# Patient Record
Sex: FEMALE | Race: WHITE | NOT HISPANIC OR LATINO | URBAN - METROPOLITAN AREA
[De-identification: names, ages, dates, MRNs, and addresses within clinical notes are randomized per-mention and may not be internally consistent; named-entity substitution may affect disease eponyms.]

---

## 2017-01-19 ENCOUNTER — APPOINTMENT (RX ONLY)
Dept: URBAN - METROPOLITAN AREA CLINIC 170 | Facility: CLINIC | Age: 67
Setting detail: DERMATOLOGY
End: 2017-01-19

## 2017-01-19 DIAGNOSIS — Z41.9 ENCOUNTER FOR PROCEDURE FOR PURPOSES OTHER THAN REMEDYING HEALTH STATE, UNSPECIFIED: ICD-10-CM

## 2017-01-19 PROCEDURE — ? FILLERS

## 2017-01-19 PROCEDURE — ? BOTOX

## 2017-01-19 ASSESSMENT — LOCATION ZONE DERM: LOCATION ZONE: FACE

## 2017-01-19 ASSESSMENT — LOCATION DETAILED DESCRIPTION DERM
LOCATION DETAILED: RIGHT INFERIOR MEDIAL MALAR CHEEK
LOCATION DETAILED: RIGHT MEDIAL MALAR CHEEK

## 2017-01-19 ASSESSMENT — LOCATION SIMPLE DESCRIPTION DERM: LOCATION SIMPLE: RIGHT CHEEK

## 2017-01-19 NOTE — PROCEDURE: FILLERS
Lateral Face Filler  Volume In Cc: 0
Map Statment: See Attach Map for Complete Details
Topical Anesthesia?: 2.5% lidocaine, 2.5% prilocaine
Detail Level: Zone
Additional Area 1 Location: Face
Lot #: [id number]
Lot #: [id number]
Expiration Date (Month Year): 7/7/17
Use Map Statement For Sites (Optional): Yes
Anesthesia Type: 1% lidocaine with epinephrine and a 1:10 solution of 8.4% sodium bicarbonate
Expiration Date (Month Year): 7/17, 6/17
Filler: Belotero
Anesthesia Volume In Cc: 0.2
Post-Care Instructions: Patient instructed to apply ice to reduce swelling.
Expiration Date (Month Year): 5/15/16
Lot #: [id number]
Consent: Written consent obtained. Risks include but not limited to bruising, beading, irregular texture, ulceration, infection, allergic reaction, scar formation, incomplete augmentation, temporary nature, procedural pain.
Additional Area 1 Volume In Cc: 2

## 2017-01-19 NOTE — PROCEDURE: BOTOX
Lateral Platysmal Bands Units: 0
Detail Level: Zone
Consent: Written consent obtained. Risks include but not limited to lid/brow ptosis, bruising, swelling, diplopia, temporary effect, incomplete chemical denervation.
Post-Care Instructions: Patient instructed to not lie down for 4 hours and limit physical activity for 24 hours.
Additional Area 1 Units: 62
Additional Area 1 Location: Face
Dilution (U/0.1 Cc): 0.4
Expiration Date (Month Year): 8/19
Lot #: [id number]
Price (Use Numbers Only, No Special Characters Or $): 848

## 2017-02-09 ENCOUNTER — APPOINTMENT (RX ONLY)
Dept: URBAN - METROPOLITAN AREA CLINIC 170 | Facility: CLINIC | Age: 67
Setting detail: DERMATOLOGY
End: 2017-02-09

## 2017-02-09 DIAGNOSIS — Z41.9 ENCOUNTER FOR PROCEDURE FOR PURPOSES OTHER THAN REMEDYING HEALTH STATE, UNSPECIFIED: ICD-10-CM

## 2017-02-09 PROCEDURE — ? PATIENT SPECIFIC COUNSELING

## 2017-06-01 ENCOUNTER — APPOINTMENT (RX ONLY)
Dept: URBAN - METROPOLITAN AREA CLINIC 170 | Facility: CLINIC | Age: 67
Setting detail: DERMATOLOGY
End: 2017-06-01

## 2017-06-01 DIAGNOSIS — Z41.9 ENCOUNTER FOR PROCEDURE FOR PURPOSES OTHER THAN REMEDYING HEALTH STATE, UNSPECIFIED: ICD-10-CM

## 2017-06-01 PROCEDURE — ? FILLERS

## 2017-06-01 PROCEDURE — ? BOTOX

## 2017-06-01 ASSESSMENT — LOCATION DETAILED DESCRIPTION DERM
LOCATION DETAILED: LEFT INFERIOR MEDIAL FOREHEAD
LOCATION DETAILED: RIGHT MEDIAL FOREHEAD

## 2017-06-01 ASSESSMENT — LOCATION SIMPLE DESCRIPTION DERM
LOCATION SIMPLE: LEFT FOREHEAD
LOCATION SIMPLE: RIGHT FOREHEAD

## 2017-06-01 ASSESSMENT — LOCATION ZONE DERM: LOCATION ZONE: FACE

## 2017-06-01 NOTE — PROCEDURE: BOTOX
Additional Area 1 Units: 62
Depressor Anguli Oris Units: 0
Dilution (U/0.1 Cc): 0.2
Price (Use Numbers Only, No Special Characters Or $): 229
Post-Care Instructions: Patient instructed to not lie down for 4 hours and limit physical activity for 24 hours.
Consent: Written consent obtained. Risks include but not limited to lid/brow ptosis, bruising, swelling, diplopia, temporary effect, incomplete chemical denervation.
Lot #:  [id number]
Expiration Date (Month Year): 12/19
Additional Area 1 Location: Face
Detail Level: Zone

## 2017-06-01 NOTE — PROCEDURE: FILLERS
Additional Area 5 Volume In Cc: 0
Filler: Ricardo Zafar
Additional Area 1 Volume In Cc: 3
Detail Level: Zone
Additional Area 1 Location: Face
Expiration Date (Month Year): 8/31/19, 6/30/19**
Lot #: [id number]
Lot #: [id number]
Map Statment: See Attach Map for Complete Details
Price (Use Numbers Only, No Special Characters Or $): 7794
Post-Care Instructions: Patient instructed to apply ice to reduce swelling.
Filler: Juvederm Ultra XC
Topical Anesthesia?: 2.5% lidocaine, 2.5% prilocaine
Expiration Date (Month Year): 4/30/19
Anesthesia Type: 1% lidocaine with epinephrine and a 1:10 solution of 8.4% sodium bicarbonate
Use Map Statement For Sites (Optional): Yes
Additional Area 1 Volume In Cc: 1
Expiration Date (Month Year): 7/3/18
Lot #: [id number], [id number]
Consent: Written consent obtained. Risks include but not limited to bruising, beading, irregular texture, ulceration, infection, allergic reaction, scar formation, incomplete augmentation, temporary nature, procedural pain.
Anesthesia Volume In Cc: 0.2

## 2017-07-05 ENCOUNTER — APPOINTMENT (RX ONLY)
Dept: URBAN - METROPOLITAN AREA CLINIC 170 | Facility: CLINIC | Age: 67
Setting detail: DERMATOLOGY
End: 2017-07-05

## 2017-07-05 DIAGNOSIS — Z41.9 ENCOUNTER FOR PROCEDURE FOR PURPOSES OTHER THAN REMEDYING HEALTH STATE, UNSPECIFIED: ICD-10-CM

## 2017-07-05 PROCEDURE — ? PATIENT SPECIFIC COUNSELING

## 2017-07-05 ASSESSMENT — LOCATION SIMPLE DESCRIPTION DERM: LOCATION SIMPLE: RIGHT CHEEK

## 2017-07-05 ASSESSMENT — LOCATION DETAILED DESCRIPTION DERM: LOCATION DETAILED: RIGHT MEDIAL MALAR CHEEK

## 2017-07-05 ASSESSMENT — LOCATION ZONE DERM: LOCATION ZONE: FACE

## 2017-08-02 ENCOUNTER — APPOINTMENT (RX ONLY)
Dept: URBAN - METROPOLITAN AREA CLINIC 170 | Facility: CLINIC | Age: 67
Setting detail: DERMATOLOGY
End: 2017-08-02

## 2017-08-02 DIAGNOSIS — L65.9 NONSCARRING HAIR LOSS, UNSPECIFIED: ICD-10-CM

## 2017-08-02 PROCEDURE — ? PLATELET RICH PLASMA INJECTION

## 2017-08-02 ASSESSMENT — LOCATION SIMPLE DESCRIPTION DERM: LOCATION SIMPLE: ANTERIOR SCALP

## 2017-08-02 ASSESSMENT — LOCATION DETAILED DESCRIPTION DERM: LOCATION DETAILED: MID-FRONTAL SCALP

## 2017-08-02 ASSESSMENT — LOCATION ZONE DERM: LOCATION ZONE: SCALP

## 2017-08-02 NOTE — PROCEDURE: PLATELET RICH PLASMA INJECTION
Detail Level: Zone
Depth In Mm (Will Not Render If 0): 0
Location #1: scalp
Post-Care Instructions: After the procedure, take precautions agains sun exposure. Do not apply sunscreen for 12 hours after the procedure. Do not apply make-up for 12 hours after the procedure. Avoid alcohol based toners for 10-14 days. After 2-3 days patients can return to their regular skin regimen.
Price (Use Numbers Only, No Special Characters Or $): 200
Standard Default Technique For Making Prp: Eclipse HC. The Stem Cell Filler was created using the following technique: The patient's blood was drawn.  The blood was place in a cetrifuge for 10 minutes to separate solid and liquid components.  The  plasma was then placed in a syringe, using sterile technique, for injection.
Consent: Written consent obtained, risks reviewed including but not limited to pain, scarring, infection and incomplete improvement.  Patient understands the procedure is cosmetic in nature and will require out of pocket payment.

## 2017-08-30 ENCOUNTER — APPOINTMENT (RX ONLY)
Dept: URBAN - METROPOLITAN AREA CLINIC 170 | Facility: CLINIC | Age: 67
Setting detail: DERMATOLOGY
End: 2017-08-30

## 2017-08-30 DIAGNOSIS — L64.8 OTHER ANDROGENIC ALOPECIA: ICD-10-CM

## 2017-08-30 PROCEDURE — ? PLATELET RICH PLASMA INJECTION

## 2017-08-30 NOTE — PROCEDURE: PLATELET RICH PLASMA INJECTION
Depth In Mm (Will Not Render If 0): 0
Detail Level: Zone
Consent: Written consent obtained, risks reviewed including but not limited to pain, scarring, infection and incomplete improvement.  Patient understands the procedure is cosmetic in nature and will require out of pocket payment.
Post-Care Instructions: After the procedure, take precautions agains sun exposure. After 2-3 days patients can return to their regular skin regimen. Ice if needed
Standard Default Technique For Making Prp: Eclipse  lot [id number] PRP was created using the following technique: The patient's blood was drawn.  The blood was place in a cetrifuge for 10 minutes to separate solid and liquid components.  The  plasma was then placed in a syringe, using sterile technique, for injection The Stem Cell Filler was created using the following technique: The patient's blood was drawn.  The blood was place in a cetrifuge for 10 minutes to separate solid and liquid components, then 6cc low potency PRP was removed and then the remaining 6 mL of a high concentration PRP was injected into the adipose layer of scalp after cleansing the skin and providing KAREN cold air for anesthesia.
Price (Use Numbers Only, No Special Characters Or $): 200

## 2020-03-03 ENCOUNTER — APPOINTMENT (RX ONLY)
Dept: URBAN - METROPOLITAN AREA CLINIC 167 | Facility: CLINIC | Age: 70
Setting detail: DERMATOLOGY
End: 2020-03-03

## 2020-03-03 DIAGNOSIS — L71.0 PERIORAL DERMATITIS: ICD-10-CM

## 2020-03-03 PROCEDURE — ? TREATMENT REGIMEN

## 2020-03-03 PROCEDURE — ? OTHER

## 2020-03-03 PROCEDURE — 99213 OFFICE O/P EST LOW 20 MIN: CPT

## 2020-03-03 PROCEDURE — ? PRESCRIPTION

## 2020-03-03 PROCEDURE — ? COUNSELING

## 2020-03-03 RX ORDER — TACROLIMUS 1 MG/G
OINTMENT TOPICAL
Qty: 1 | Refills: 3 | Status: ERX | COMMUNITY
Start: 2020-03-03

## 2020-03-03 RX ADMIN — TACROLIMUS: 1 OINTMENT TOPICAL at 00:00

## 2020-03-03 ASSESSMENT — LOCATION DETAILED DESCRIPTION DERM
LOCATION DETAILED: RIGHT INFERIOR MEDIAL MALAR CHEEK
LOCATION DETAILED: RIGHT LOWER CUTANEOUS LIP
LOCATION DETAILED: LEFT UPPER CUTANEOUS LIP

## 2020-03-03 ASSESSMENT — LOCATION SIMPLE DESCRIPTION DERM
LOCATION SIMPLE: RIGHT CHEEK
LOCATION SIMPLE: LEFT LIP
LOCATION SIMPLE: RIGHT LIP

## 2020-03-03 ASSESSMENT — LOCATION ZONE DERM
LOCATION ZONE: FACE
LOCATION ZONE: LIP

## 2020-03-03 NOTE — PROCEDURE: OTHER
Detail Level: Detailed
Note Text (......Xxx Chief Complaint.): This diagnosis correlates with the
Other (Free Text): Patient here for red cheeks and bumps on face x 4-5 months\\nHas been on oral steroids for RA\\nStarted Orencia and she started getting more bumps around her nose\\nHad it around pregnancy once before, 40 years ago\\nItching\\nClusters of red bumps and occ pustules\\nHad a couple of styes\\nHer  is an ophtho - said this happens with rosaea\\n\\nHas used metrogel x two weeks\\nTolerated well\\nUsed a steroid on the face when bright red and itchy\\nUsed fluocinonide gel, which calmed it down - stop this\\nHas pulmonary fibrosis\\n\\nDoes not drink alcohol\\nHas stress, but less now that she is retired\\n\\nStart doxy 100mg QD\\nCon’t metronidazole\\nMay have tacrolimus at home\\nWill send an Rx for tacrolimus

## 2020-08-17 ENCOUNTER — APPOINTMENT (RX ONLY)
Dept: URBAN - METROPOLITAN AREA CLINIC 170 | Facility: CLINIC | Age: 70
Setting detail: DERMATOLOGY
End: 2020-08-17

## 2020-08-17 DIAGNOSIS — D69.2 OTHER NONTHROMBOCYTOPENIC PURPURA: ICD-10-CM

## 2020-08-17 DIAGNOSIS — L03.01 CELLULITIS OF FINGER: ICD-10-CM

## 2020-08-17 DIAGNOSIS — L71.0 PERIORAL DERMATITIS: ICD-10-CM | Status: UNCHANGED

## 2020-08-17 DIAGNOSIS — L60.3 NAIL DYSTROPHY: ICD-10-CM

## 2020-08-17 PROBLEM — L03.011 CELLULITIS OF RIGHT FINGER: Status: ACTIVE | Noted: 2020-08-17

## 2020-08-17 PROCEDURE — ? PRESCRIPTION

## 2020-08-17 PROCEDURE — 99213 OFFICE O/P EST LOW 20 MIN: CPT

## 2020-08-17 PROCEDURE — ? OTHER

## 2020-08-17 PROCEDURE — ? TREATMENT REGIMEN

## 2020-08-17 PROCEDURE — ? COUNSELING

## 2020-08-17 RX ORDER — SULFACETAMIDE SODIUM AND SULFUR 10; 5 MG/G; MG/G
RINSE TOPICAL
Qty: 1 | Refills: 3 | Status: ERX | COMMUNITY
Start: 2020-08-17

## 2020-08-17 RX ORDER — NYSTATIN 100000 [USP'U]/G
CREAM TOPICAL
Qty: 1 | Refills: 1 | Status: ERX | COMMUNITY
Start: 2020-08-17

## 2020-08-17 RX ORDER — TACROLIMUS 1 MG/G
OINTMENT TOPICAL
Qty: 1 | Refills: 3 | Status: ERX | COMMUNITY
Start: 2020-08-17

## 2020-08-17 RX ADMIN — TACROLIMUS: 1 OINTMENT TOPICAL at 00:00

## 2020-08-17 RX ADMIN — SULFACETAMIDE SODIUM AND SULFUR: 10; 5 RINSE TOPICAL at 00:00

## 2020-08-17 RX ADMIN — NYSTATIN: 100000 CREAM TOPICAL at 00:00

## 2020-08-17 ASSESSMENT — LOCATION DETAILED DESCRIPTION DERM
LOCATION DETAILED: LEFT UPPER CUTANEOUS LIP
LOCATION DETAILED: RIGHT LOWER CUTANEOUS LIP
LOCATION DETAILED: RIGHT INFERIOR MEDIAL MALAR CHEEK
LOCATION DETAILED: RIGHT MIDDLE FINGERNAIL
LOCATION DETAILED: LEFT VENTRAL PROXIMAL FOREARM
LOCATION DETAILED: RIGHT VENTRAL PROXIMAL FOREARM
LOCATION DETAILED: LEFT MIDDLE FINGERNAIL

## 2020-08-17 ASSESSMENT — LOCATION SIMPLE DESCRIPTION DERM
LOCATION SIMPLE: LEFT LIP
LOCATION SIMPLE: RIGHT FOREARM
LOCATION SIMPLE: RIGHT LIP
LOCATION SIMPLE: RIGHT CHEEK
LOCATION SIMPLE: LEFT MIDDLE FINGERNAIL
LOCATION SIMPLE: LEFT FOREARM
LOCATION SIMPLE: RIGHT MIDDLE FINGER

## 2020-08-17 ASSESSMENT — LOCATION ZONE DERM
LOCATION ZONE: FINGERNAIL
LOCATION ZONE: ARM
LOCATION ZONE: FINGER
LOCATION ZONE: LIP
LOCATION ZONE: FACE

## 2020-08-17 NOTE — PROCEDURE: TREATMENT REGIMEN
Detail Level: Zone
Plan: Patient declined Doxycycline use at this time.
Otc Regimen: Differin \\nDermend
Initiate Treatment: Nystatin cream

## 2020-08-17 NOTE — PROCEDURE: OTHER
Detail Level: Detailed
Note Text (......Xxx Chief Complaint.): This diagnosis correlates with the
Other (Free Text): Patient here for red cheeks and bumps on face x 4-5 months\\nHas been on oral steroids for RA\\nStarted Orencia and she started getting more bumps around her nose\\nHad it around pregnancy once before, 40 years ago\\nItching\\nClusters of red bumps and occ pustules\\nHad a couple of styes\\nHer  is an ophtho - said this happens with rosaea\\n\\nHas used metrogel x two weeks\\nTolerated well\\nUsed a steroid on the face when bright red and itchy\\nUsed fluocinonide gel, which calmed it down - stop this\\nHas pulmonary fibrosis\\n\\nDoes not drink alcohol\\nHas stress, but less now that she is retired\\n\\nStart doxy 100mg QD\\nCon’t metronidazole\\nMay have tacrolimus at home\\nWill send an Rx for tacrolimus
Other (Free Text): Last seen 3/3/20\\n\\nhad red cheeks, itchy, red bumps, and occ pustules on face since about Oct/Nov\\grzegorz had a couple of styes ( is optho)\\non oral steroids for RA\\ngot worse after starting Orencia\\nhad it once before around her pregnancy 40 years ago\\n\\npreviously used metrogel inconsistently, tolerated well, but was still getting new bumps\\nonly 1/2 glass of ETOH every other day\\ndoes not get sunlight much\\ncan't tolerate spicy foods due to the spensitivy in the mouh due her medications\\neats occ tomatoes, has reflux, don't taste good here\\neats chocolate - tamika's peanut butter cups\\nno increased stress\\n\\nRx'd tacrolimus and doxy 100mg\\npatient did not fill Rx's due to possible COVID and other health concerns.\\nSymptoms Not worsening, only spreading on face.\\n\\nshe did not fill the tacrolimus\\nshe was going to try metronidazole more consistently first\\nhad an old rx at home\\nhas an Rx at [facility]\\nwill start BID\\n\\nstopped fluocinonide gel\\nnot interested in doxy yet because she is on so many oral medications\\n\\nstart sulfur sulfacetamide wash\\nrecc acnomel or prosacea OTC
Other (Free Text): Has paper thin skin, requesting advice \\nworse when carrying grocery bags\\npatient is on prednisone 4mg for RA and taking Aleve\\nrecc Dermend and/or Differin
Other (Free Text): Tender, indentation at the base of the fingernail on right middle finger x 1 month.\\ncuticle was inflamed\\nlooked a little yellow\\nshe did not see any pus\\nnail is still indented in the middle as it is growing out\\nshe is worried about a fungus\\n\\nWas inflamed 1 week ago \\nshe soaked in salt water, and it got better within 3 days\\n\\nthe proximal nail fold was slightly red, no pus\\nher nail in indented near that fold and is growing out - due to the chronic inflammation\\nshe likely has a yeast paronychia, chronic\\nstart nystatin cream BID\\nreduce water work (wear gloves)